# Patient Record
Sex: MALE | URBAN - METROPOLITAN AREA
[De-identification: names, ages, dates, MRNs, and addresses within clinical notes are randomized per-mention and may not be internally consistent; named-entity substitution may affect disease eponyms.]

---

## 2023-08-19 ENCOUNTER — APPOINTMENT (RX ONLY)
Dept: URBAN - METROPOLITAN AREA CLINIC 58 | Facility: CLINIC | Age: 78
Setting detail: DERMATOLOGY
End: 2023-08-19

## 2023-08-19 DIAGNOSIS — L82.0 INFLAMED SEBORRHEIC KERATOSIS: ICD-10-CM

## 2023-08-19 PROBLEM — D48.5 NEOPLASM OF UNCERTAIN BEHAVIOR OF SKIN: Status: ACTIVE | Noted: 2023-08-19

## 2023-08-19 PROCEDURE — ? COUNSELING

## 2023-08-19 PROCEDURE — 99202 OFFICE O/P NEW SF 15 MIN: CPT

## 2023-10-21 ENCOUNTER — APPOINTMENT (RX ONLY)
Dept: URBAN - METROPOLITAN AREA CLINIC 58 | Facility: CLINIC | Age: 78
Setting detail: DERMATOLOGY
End: 2023-10-21

## 2023-10-21 DIAGNOSIS — D49.2 NEOPLASM OF UNSPECIFIED BEHAVIOR OF BONE, SOFT TISSUE, AND SKIN: ICD-10-CM

## 2023-10-21 PROCEDURE — ? BIOPSY BY PUNCH METHOD

## 2023-10-21 PROCEDURE — 11104 PUNCH BX SKIN SINGLE LESION: CPT

## 2023-10-21 ASSESSMENT — LOCATION ZONE DERM: LOCATION ZONE: FACE

## 2023-10-21 ASSESSMENT — LOCATION SIMPLE DESCRIPTION DERM: LOCATION SIMPLE: RIGHT CHEEK

## 2023-10-21 ASSESSMENT — LOCATION DETAILED DESCRIPTION DERM: LOCATION DETAILED: RIGHT SUPERIOR CENTRAL MALAR CHEEK

## 2023-10-21 NOTE — HPI: EVALUATION OF SKIN LESION(S)
What Type Of Note Output Would You Prefer (Optional)?: Standard Output
Hpi Title: Evaluation of a Skin Lesion
How Severe Are Your Spot(S)?: mild
Have Your Spot(S) Been Treated In The Past?: has been treated
When Was It Treated?: 08/19/2023
Year Removed: 1900

## 2023-10-21 NOTE — PROCEDURE: BIOPSY BY PUNCH METHOD

## 2023-10-27 ENCOUNTER — APPOINTMENT (RX ONLY)
Dept: URBAN - METROPOLITAN AREA CLINIC 58 | Facility: CLINIC | Age: 78
Setting detail: DERMATOLOGY
End: 2023-10-27

## 2023-10-27 DIAGNOSIS — Z48.02 ENCOUNTER FOR REMOVAL OF SUTURES: ICD-10-CM

## 2023-10-27 PROCEDURE — ? SUTURE REMOVAL (GLOBAL PERIOD)

## 2023-10-27 PROCEDURE — 99024 POSTOP FOLLOW-UP VISIT: CPT

## 2023-10-27 ASSESSMENT — LOCATION ZONE DERM: LOCATION ZONE: FACE

## 2023-10-27 ASSESSMENT — LOCATION DETAILED DESCRIPTION DERM: LOCATION DETAILED: RIGHT SUPERIOR CENTRAL MALAR CHEEK

## 2023-10-27 ASSESSMENT — LOCATION SIMPLE DESCRIPTION DERM: LOCATION SIMPLE: RIGHT CHEEK

## 2023-10-27 NOTE — PROCEDURE: SUTURE REMOVAL (GLOBAL PERIOD)
Detail Level: Detailed
Add 73164 Cpt? (Important Note: In 2017 The Use Of 68758 Is Being Tracked By Cms To Determine Future Global Period Reimbursement For Global Periods): yes